# Patient Record
Sex: MALE | Race: BLACK OR AFRICAN AMERICAN | NOT HISPANIC OR LATINO | URBAN - METROPOLITAN AREA
[De-identification: names, ages, dates, MRNs, and addresses within clinical notes are randomized per-mention and may not be internally consistent; named-entity substitution may affect disease eponyms.]

---

## 2024-04-22 ENCOUNTER — LAB (OUTPATIENT)
Dept: URBAN - METROPOLITAN AREA CLINIC 113 | Facility: CLINIC | Age: 74
End: 2024-04-22

## 2024-04-22 ENCOUNTER — OV NP (OUTPATIENT)
Dept: URBAN - METROPOLITAN AREA CLINIC 113 | Facility: CLINIC | Age: 74
End: 2024-04-22
Payer: MEDICARE

## 2024-04-22 VITALS
SYSTOLIC BLOOD PRESSURE: 130 MMHG | WEIGHT: 276 LBS | HEIGHT: 75 IN | RESPIRATION RATE: 14 BRPM | DIASTOLIC BLOOD PRESSURE: 67 MMHG | HEART RATE: 71 BPM | BODY MASS INDEX: 34.32 KG/M2 | TEMPERATURE: 97.2 F

## 2024-04-22 DIAGNOSIS — R10.84 GENERALIZED ABDOMINAL PAIN: ICD-10-CM

## 2024-04-22 DIAGNOSIS — K42.9 UMBILICAL HERNIA: ICD-10-CM

## 2024-04-22 DIAGNOSIS — Z12.11 COLON CANCER SCREENING: ICD-10-CM

## 2024-04-22 DIAGNOSIS — K86.2 PANCREATIC CYST: ICD-10-CM

## 2024-04-22 PROCEDURE — 99214 OFFICE O/P EST MOD 30 MIN: CPT | Performed by: INTERNAL MEDICINE

## 2024-04-22 RX ORDER — NITROGLYCERIN 0.4 MG/1
TABLET SUBLINGUAL
Qty: 25 | Refills: 0 | Status: ACTIVE | COMMUNITY
Start: 2012-12-06

## 2024-04-22 RX ORDER — LOVASTATIN 20 MG/1
1 TABLET WITH THE EVENING MEAL TABLET ORAL ONCE A DAY
Status: ACTIVE | COMMUNITY

## 2024-04-22 RX ORDER — VITAMIN B COMPLEX
AS DIRECTED CAPSULE ORAL
Status: ACTIVE | COMMUNITY

## 2024-04-22 RX ORDER — PROPAFENONE HYDROCHLORIDE 150 MG/1
1 TABLET TABLET, FILM COATED ORAL
Status: ACTIVE | COMMUNITY

## 2024-04-22 RX ORDER — ATENOLOL AND CHLORTHALIDONE 50; 25 MG/1; MG/1
TABLET ORAL
Qty: 90 | Refills: 0 | Status: ACTIVE | COMMUNITY
Start: 2011-12-11

## 2024-04-22 RX ORDER — HYDROCODONE BITARTRATE AND ACETAMINOPHEN 10; 325 MG/1; MG/1
TAKE 1 TABLET EVERY 6 HOURS AS NEEDED TABLET ORAL
Refills: 0 | Status: ACTIVE | COMMUNITY
Start: 2012-11-16

## 2024-04-22 RX ORDER — DICYCLOMINE HYDROCHLORIDE 20 MG/1
1 TABLET TABLET ORAL
Qty: 45 | Refills: 6 | OUTPATIENT
Start: 2024-04-22 | End: 2024-11-17

## 2024-04-22 NOTE — HPI-TODAY'S VISIT:
73-year-old man presents with a history of abdominal pain.  He is a retired pharmacist. He has been having some mild generalized functional abdominal discomfort. He states when he has a hard stool that he can feel the stool traversing the colon. It results in some mild generalized abdominal discomfort which is usually on the left side. He has intermittent mild heartburn but is not severe. He does not have dysphagia. Weight is stable. No rectal bleeding or melena. Appetite seems to be fine. No reports of nausea or vomiting. He is due for screening colonoscopy. There is a history of an incidentally identified pancreatic cyst which was very small.  March 2024.  Labs.  Hemoglobin 13.4.  CMP normal except for creatinine 1.2.  February 2023.  MRI abdomen.  With and without IV contrast.  Cystic lesions within the tail of the pancreas measuring up to 4 mm.  2.5 cm left adrenal nodule consistent with adenoma.  Fat-containing umbilical hernia.  2013.  Colonoscopy.  Moderate internal hemorrhoids.  Otherwise normal.  2013.  EGD.  Moderate nonerosive antral gastritis.  Solitary duodenal bulb erosion.  Antral biopsies were notable for mild gastritis without H. pylori being identified.  2011.  EGD.  Dr. Elder.  Mild duodenitis.  Normal Z-line.  Biopsies were notable for normal squamous epithelium.  2007.  Colonoscopy.  Mild diverticulosis and internal hemorrhoids.  Otherwise normal.  Occupation: Retired pharmacist

## 2024-05-06 ENCOUNTER — TELEPHONE ENCOUNTER (OUTPATIENT)
Dept: URBAN - METROPOLITAN AREA CLINIC 113 | Facility: CLINIC | Age: 74
End: 2024-05-06

## 2024-05-06 RX ORDER — DICYCLOMINE HYDROCHLORIDE 20 MG/1
1 TABLET TABLET ORAL
Qty: 90 | Refills: 5 | OUTPATIENT
Start: 2024-05-06 | End: 2024-11-02

## 2024-08-29 ENCOUNTER — TELEPHONE ENCOUNTER (OUTPATIENT)
Dept: URBAN - METROPOLITAN AREA CLINIC 113 | Facility: CLINIC | Age: 74
End: 2024-08-29

## 2024-11-26 ENCOUNTER — TELEPHONE ENCOUNTER (OUTPATIENT)
Dept: URBAN - METROPOLITAN AREA CLINIC 113 | Facility: CLINIC | Age: 74
End: 2024-11-26

## 2025-01-02 ENCOUNTER — DASHBOARD ENCOUNTERS (OUTPATIENT)
Age: 75
End: 2025-01-02

## 2025-01-02 ENCOUNTER — OFFICE VISIT (OUTPATIENT)
Dept: URBAN - METROPOLITAN AREA CLINIC 113 | Facility: CLINIC | Age: 75
End: 2025-01-02
Payer: MEDICARE

## 2025-01-02 ENCOUNTER — LAB OUTSIDE AN ENCOUNTER (OUTPATIENT)
Dept: URBAN - METROPOLITAN AREA CLINIC 113 | Facility: CLINIC | Age: 75
End: 2025-01-02

## 2025-01-02 VITALS
BODY MASS INDEX: 34.82 KG/M2 | SYSTOLIC BLOOD PRESSURE: 120 MMHG | DIASTOLIC BLOOD PRESSURE: 71 MMHG | WEIGHT: 280 LBS | TEMPERATURE: 97.9 F | HEART RATE: 68 BPM | HEIGHT: 75 IN | RESPIRATION RATE: 16 BRPM

## 2025-01-02 DIAGNOSIS — R10.84 GENERALIZED ABDOMINAL PAIN: ICD-10-CM

## 2025-01-02 DIAGNOSIS — Z12.11 COLON CANCER SCREENING: ICD-10-CM

## 2025-01-02 DIAGNOSIS — K42.9 UMBILICAL HERNIA: ICD-10-CM

## 2025-01-02 DIAGNOSIS — K86.2 PANCREATIC CYST: ICD-10-CM

## 2025-01-02 PROCEDURE — 99214 OFFICE O/P EST MOD 30 MIN: CPT | Performed by: INTERNAL MEDICINE

## 2025-01-02 RX ORDER — NITROGLYCERIN 0.4 MG/1
TABLET SUBLINGUAL
Qty: 25 | Refills: 0 | Status: ACTIVE | COMMUNITY
Start: 2012-12-06

## 2025-01-02 RX ORDER — VITAMIN B COMPLEX
AS DIRECTED CAPSULE ORAL
Status: ACTIVE | COMMUNITY

## 2025-01-02 RX ORDER — HYDROCODONE BITARTRATE AND ACETAMINOPHEN 10; 325 MG/1; MG/1
TAKE 1 TABLET EVERY 6 HOURS AS NEEDED TABLET ORAL
Refills: 0 | Status: ACTIVE | COMMUNITY
Start: 2012-11-16

## 2025-01-02 RX ORDER — PROPAFENONE HYDROCHLORIDE 150 MG/1
1 TABLET TABLET, FILM COATED ORAL
Status: ACTIVE | COMMUNITY

## 2025-01-02 RX ORDER — LANSOPRAZOLE 15 MG/1
1 CAPSULE 1/2 TO 1 HOUR BEFORE MORNING MEAL CAPSULE, DELAYED RELEASE ORAL ONCE A DAY
Status: ACTIVE | COMMUNITY

## 2025-01-02 RX ORDER — LOVASTATIN 20 MG/1
1 TABLET WITH THE EVENING MEAL TABLET ORAL ONCE A DAY
Status: ACTIVE | COMMUNITY

## 2025-01-02 RX ORDER — ATENOLOL AND CHLORTHALIDONE 50; 25 MG/1; MG/1
TABLET ORAL
Qty: 90 | Refills: 0 | Status: ACTIVE | COMMUNITY
Start: 2011-12-11

## 2025-01-02 NOTE — HPI-TODAY'S VISIT:
73-year-old man presents with a history of abdominal pain.  He has on long-term narcotic therapy because of chronic back pain. He has 1 bowel movement per day but does feel as though he has constipation from time to time. He does not have rectal bleeding or melena. He takes MiraLAX but he says it is just sits in his stomach and does not have consistent effect. He has chronic generalized abdominal discomfort associate with functional bloating. He does not have dysphagia heartburn or regurgitation at this time.  MRI. With and without IV contrast. Arnol. October 2024. Stable size and appearance of the left adrenal nodule. Also stable small T2 hyperintense cystic lesions within the tail most likely representing sidebranch IPMN's. Another lesion was notable in the pancreatic body. The pancreatic duct was normal in dimension.  Labs. November 2024. Hemoglobin 14.0. MCV 87. Platelet count 191K, creatinine 1.3, total bilirubin 1.2, AST 23, ALT 19. Alkaline phos is 83.  MRI. Abdomen. Without contrast. April 2024. Gallbladder normal. Common bile duct normal. Scattered pancreatic tail cystic lesions up to 5 mm consistent with sidebranch IPMN's. Small adrenal adenoma.  March 2024.  Labs.  Hemoglobin 13.4.  CMP normal except for creatinine 1.2.  February 2023.  MRI abdomen.  With and without IV contrast.  Cystic lesions within the tail of the pancreas measuring up to 4 mm.  2.5 cm left adrenal nodule consistent with adenoma.  Fat-containing umbilical hernia.  2013.  Colonoscopy.  Moderate internal hemorrhoids.  Otherwise normal.  2013.  EGD.  Moderate nonerosive antral gastritis.  Solitary duodenal bulb erosion.  Antral biopsies were notable for mild gastritis without H. pylori being identified.  2011.  EGD.  Dr. Elder.  Mild duodenitis.  Normal Z-line.  Biopsies were notable for normal squamous epithelium.  2007.  Colonoscopy.  Mild diverticulosis and internal hemorrhoids.  Otherwise normal.  Occupation: Retired pharmacist

## 2025-01-08 ENCOUNTER — TELEPHONE ENCOUNTER (OUTPATIENT)
Dept: URBAN - METROPOLITAN AREA CLINIC 112 | Facility: CLINIC | Age: 75
End: 2025-01-08

## 2025-01-13 ENCOUNTER — OFFICE VISIT (OUTPATIENT)
Dept: URBAN - METROPOLITAN AREA SURGERY CENTER 25 | Facility: SURGERY CENTER | Age: 75
End: 2025-01-13

## 2025-01-14 ENCOUNTER — TELEPHONE ENCOUNTER (OUTPATIENT)
Dept: URBAN - METROPOLITAN AREA CLINIC 113 | Facility: CLINIC | Age: 75
End: 2025-01-14

## 2025-01-14 RX ORDER — SODIUM, POTASSIUM,MAG SULFATES 17.5-3.13G
AS DIRECTED SOLUTION, RECONSTITUTED, ORAL ORAL
Refills: 0 | OUTPATIENT
Start: 2025-01-14

## 2025-01-27 ENCOUNTER — OFFICE VISIT (OUTPATIENT)
Dept: URBAN - METROPOLITAN AREA SURGERY CENTER 25 | Facility: SURGERY CENTER | Age: 75
End: 2025-01-27

## 2025-02-03 ENCOUNTER — OFFICE VISIT (OUTPATIENT)
Dept: URBAN - METROPOLITAN AREA CLINIC 113 | Facility: CLINIC | Age: 75
End: 2025-02-03

## 2025-02-14 ENCOUNTER — OFFICE VISIT (OUTPATIENT)
Dept: URBAN - METROPOLITAN AREA SURGERY CENTER 25 | Facility: SURGERY CENTER | Age: 75
End: 2025-02-14